# Patient Record
Sex: FEMALE | URBAN - METROPOLITAN AREA
[De-identification: names, ages, dates, MRNs, and addresses within clinical notes are randomized per-mention and may not be internally consistent; named-entity substitution may affect disease eponyms.]

---

## 2024-02-13 ENCOUNTER — OV NP (OUTPATIENT)
Dept: URBAN - METROPOLITAN AREA CLINIC 35 | Facility: CLINIC | Age: 44
End: 2024-02-13
Payer: COMMERCIAL

## 2024-02-13 ENCOUNTER — LAB (OUTPATIENT)
Dept: URBAN - METROPOLITAN AREA CLINIC 35 | Facility: CLINIC | Age: 44
End: 2024-02-13

## 2024-02-13 VITALS
DIASTOLIC BLOOD PRESSURE: 78 MMHG | WEIGHT: 160 LBS | SYSTOLIC BLOOD PRESSURE: 118 MMHG | OXYGEN SATURATION: 97 % | BODY MASS INDEX: 26.66 KG/M2 | HEIGHT: 65 IN | HEART RATE: 81 BPM

## 2024-02-13 DIAGNOSIS — Z12.11 ENCOUNTER FOR SCREENING FOR MALIGNANT NEOPLASM OF COLON: ICD-10-CM

## 2024-02-13 DIAGNOSIS — D50.9 IRON DEFICIENCY ANEMIA, UNSPECIFIED IRON DEFICIENCY ANEMIA TYPE: ICD-10-CM

## 2024-02-13 PROBLEM — 87522002: Status: ACTIVE | Noted: 2024-02-13

## 2024-02-13 PROBLEM — 305058001: Status: ACTIVE | Noted: 2024-02-13

## 2024-02-13 PROCEDURE — 99204 OFFICE O/P NEW MOD 45 MIN: CPT | Performed by: INTERNAL MEDICINE

## 2024-02-13 RX ORDER — VIT C/HESPERIDIN/BIOFLAVONOIDS 500-100 MG
1 TABLET TABLET ORAL ONCE A DAY
Status: ACTIVE | COMMUNITY

## 2024-02-13 RX ORDER — FERRIC CARBOXYMALTOSE INJECTION 50 MG/ML
AS DIRECTED INJECTION, SOLUTION INTRAVENOUS
OUTPATIENT
Start: 2024-02-13 | End: 2024-02-27

## 2024-02-13 RX ORDER — MULTIVIT-MIN/IRON/FOLIC ACID/K 18-600-40
1 CAPSULE CAPSULE ORAL ONCE A DAY
Status: ACTIVE | COMMUNITY

## 2024-02-13 RX ORDER — SODIUM, POTASSIUM,MAG SULFATES 17.5-3.13G
AS DIRECTED SOLUTION, RECONSTITUTED, ORAL ORAL AS DIRECTED
Qty: 1 | Refills: 0 | OUTPATIENT
Start: 2024-02-13 | End: 2024-02-14

## 2024-02-13 RX ORDER — FERROUS SULFATE 325(65) MG
1 TABLET TABLET ORAL
Status: ACTIVE | COMMUNITY

## 2024-02-13 NOTE — HPI-COLORECTAL CANCER SCREENING
43 year old patient presents for colorectal cancerscreening consult. Patient is here due to age . Patient admits this will be first colonoscopy. Patient denies family hx of colon cancer. Patient denies family hx of colon polyps. Patient admits normal bowel habits at this time. Patient denies any current symptomsof rectal bleeding, melena or mucus. Patient denies any concerns at this time.

## 2024-02-13 NOTE — HPI-ANEMIA
Patient was first diagnosed 01/26/24 post recent labs  by  ,and the most recent labs were taken (01/26/2024). Patient currently states that they are taking iron supplements and that they have not had iron infusion. Patient denies donating blood . Patient denies NSAID use, a history of GI bleeding, any blood or melena instools, epigastric/abdominal pains, fatigue, cold extremities, lightheadedness, dizziness.    Patient states that they have not had an EGD before.  Pt. has not had colonoscopy done yet. She denies any nausea or vomiting.  Of note patient had hx of very heavy menstrual cycles . She also states she had Uterine u/s which revealed uterine polyps .   Outside Labs; (01/22/2024)  CBC: Hgb is LOW at 9.8, Hct is LOW at 32.7, MCV is LOW at 78, MCH is LOW at 23.4, MCHC is LOW at 30, RDW is HIGH at 15.9 , All other values within normal range.   CMP : All values within normal range .   (01/26/2024)  Iron &TIBC : Iron Sat is LOW at 8, All other values normal   Ferritin is LOW at 6.

## 2024-03-04 ENCOUNTER — IRN (OUTPATIENT)
Dept: URBAN - METROPOLITAN AREA CLINIC 97 | Facility: CLINIC | Age: 44
End: 2024-03-04
Payer: COMMERCIAL

## 2024-03-04 VITALS
RESPIRATION RATE: 18 BRPM | HEIGHT: 65 IN | HEART RATE: 76 BPM | BODY MASS INDEX: 26.99 KG/M2 | DIASTOLIC BLOOD PRESSURE: 77 MMHG | TEMPERATURE: 98.1 F | WEIGHT: 162 LBS | SYSTOLIC BLOOD PRESSURE: 121 MMHG

## 2024-03-04 DIAGNOSIS — D50.8 OTHER IRON DEFICIENCY ANEMIA: ICD-10-CM

## 2024-03-04 PROCEDURE — 96365 THER/PROPH/DIAG IV INF INIT: CPT | Performed by: INTERNAL MEDICINE

## 2024-03-04 RX ORDER — VIT C/HESPERIDIN/BIOFLAVONOIDS 500-100 MG
1 TABLET TABLET ORAL ONCE A DAY
Status: ACTIVE | COMMUNITY

## 2024-03-04 RX ORDER — FERROUS SULFATE 325(65) MG
1 TABLET TABLET ORAL
Status: ACTIVE | COMMUNITY

## 2024-03-04 RX ORDER — MULTIVIT-MIN/IRON/FOLIC ACID/K 18-600-40
1 CAPSULE CAPSULE ORAL ONCE A DAY
Status: ACTIVE | COMMUNITY

## 2024-03-07 ENCOUNTER — LAB (OUTPATIENT)
Dept: URBAN - METROPOLITAN AREA CLINIC 4 | Facility: CLINIC | Age: 44
End: 2024-03-07
Payer: COMMERCIAL

## 2024-03-07 ENCOUNTER — COL/EGD (OUTPATIENT)
Dept: URBAN - METROPOLITAN AREA SURGERY CENTER 8 | Facility: SURGERY CENTER | Age: 44
End: 2024-03-07
Payer: COMMERCIAL

## 2024-03-07 DIAGNOSIS — B96.81 HELICOBACTER PYLORI [H. PYLORI] AS THE CAUSE OF DISEASES CLASSIFIED ELSEWHERE: ICD-10-CM

## 2024-03-07 DIAGNOSIS — K31.89 OTHER DISEASES OF STOMACH AND DUODENUM: ICD-10-CM

## 2024-03-07 DIAGNOSIS — K29.60 OTHER GASTRITIS WITHOUT BLEEDING: ICD-10-CM

## 2024-03-07 DIAGNOSIS — Z12.11 ENCOUNTER FOR SCREENING FOR MALIGNANT NEOPLASM OF COLON: ICD-10-CM

## 2024-03-07 DIAGNOSIS — D50.9 IRON DEFICIENCY ANEMIA, UNSPECIFIED IRON DEFICIENCY ANEMIA TYPE: ICD-10-CM

## 2024-03-07 PROCEDURE — 88342 IMHCHEM/IMCYTCHM 1ST ANTB: CPT | Performed by: PATHOLOGY

## 2024-03-07 PROCEDURE — 88305 TISSUE EXAM BY PATHOLOGIST: CPT | Performed by: PATHOLOGY

## 2024-03-07 PROCEDURE — 88341 IMHCHEM/IMCYTCHM EA ADD ANTB: CPT | Performed by: PATHOLOGY

## 2024-03-07 PROCEDURE — 43239 EGD BIOPSY SINGLE/MULTIPLE: CPT | Performed by: INTERNAL MEDICINE

## 2024-03-07 PROCEDURE — 45378 DIAGNOSTIC COLONOSCOPY: CPT | Performed by: INTERNAL MEDICINE

## 2024-03-18 ENCOUNTER — IRN (OUTPATIENT)
Dept: URBAN - METROPOLITAN AREA CLINIC 97 | Facility: CLINIC | Age: 44
End: 2024-03-18
Payer: COMMERCIAL

## 2024-03-18 VITALS
SYSTOLIC BLOOD PRESSURE: 128 MMHG | DIASTOLIC BLOOD PRESSURE: 90 MMHG | HEIGHT: 65 IN | HEART RATE: 71 BPM | WEIGHT: 160 LBS | TEMPERATURE: 97.7 F | BODY MASS INDEX: 26.66 KG/M2 | RESPIRATION RATE: 18 BRPM

## 2024-03-18 DIAGNOSIS — D50.8 OTHER IRON DEFICIENCY ANEMIA: ICD-10-CM

## 2024-03-18 PROCEDURE — 96365 THER/PROPH/DIAG IV INF INIT: CPT | Performed by: INTERNAL MEDICINE

## 2024-03-18 RX ORDER — FERROUS SULFATE 325(65) MG
1 TABLET TABLET ORAL
Status: ACTIVE | COMMUNITY

## 2024-03-18 RX ORDER — VIT C/HESPERIDIN/BIOFLAVONOIDS 500-100 MG
1 TABLET TABLET ORAL ONCE A DAY
Status: ACTIVE | COMMUNITY

## 2024-03-18 RX ORDER — MULTIVIT-MIN/IRON/FOLIC ACID/K 18-600-40
1 CAPSULE CAPSULE ORAL ONCE A DAY
Status: ACTIVE | COMMUNITY

## 2024-03-20 ENCOUNTER — OV EP (OUTPATIENT)
Dept: URBAN - METROPOLITAN AREA CLINIC 33 | Facility: CLINIC | Age: 44
End: 2024-03-20
Payer: COMMERCIAL

## 2024-03-20 VITALS
HEART RATE: 83 BPM | DIASTOLIC BLOOD PRESSURE: 88 MMHG | WEIGHT: 159 LBS | SYSTOLIC BLOOD PRESSURE: 122 MMHG | BODY MASS INDEX: 26.49 KG/M2 | HEIGHT: 65 IN | OXYGEN SATURATION: 98 %

## 2024-03-20 DIAGNOSIS — Z12.11 ENCOUNTER FOR SCREENING FOR MALIGNANT NEOPLASM OF COLON: ICD-10-CM

## 2024-03-20 DIAGNOSIS — D50.9 IRON DEFICIENCY ANEMIA, UNSPECIFIED IRON DEFICIENCY ANEMIA TYPE: ICD-10-CM

## 2024-03-20 DIAGNOSIS — A04.8 HELICOBACTER PYLORI (H. PYLORI) INFECTION: ICD-10-CM

## 2024-03-20 PROCEDURE — 99214 OFFICE O/P EST MOD 30 MIN: CPT | Performed by: INTERNAL MEDICINE

## 2024-03-20 RX ORDER — TETRACYCLINE HYDROCHLORIDE 500 MG/1
1 CAPSULE ON AN EMPTY STOMACH CAPSULE ORAL
Qty: 56 CAPSULE | Refills: 0 | OUTPATIENT
Start: 2024-03-20 | End: 2024-04-03

## 2024-03-20 RX ORDER — VIT C/HESPERIDIN/BIOFLAVONOIDS 500-100 MG
1 TABLET TABLET ORAL ONCE A DAY
Status: ON HOLD | COMMUNITY

## 2024-03-20 RX ORDER — METRONIDAZOLE 500 MG/1
1 TABLET TABLET ORAL
Qty: 56 TABLET | Refills: 0 | OUTPATIENT
Start: 2024-03-20 | End: 2024-04-03

## 2024-03-20 RX ORDER — OMEPRAZOLE 40 MG/1
ONE BEFORE MEALS CAPSULE, DELAYED RELEASE ORAL TWICE A DAY
Qty: 28 | Refills: 0 | OUTPATIENT
Start: 2024-03-20

## 2024-03-20 RX ORDER — FERROUS SULFATE 325(65) MG
1 TABLET TABLET ORAL
Status: ON HOLD | COMMUNITY

## 2024-03-20 RX ORDER — MULTIVIT-MIN/IRON/FOLIC ACID/K 18-600-40
1 CAPSULE CAPSULE ORAL ONCE A DAY
Status: ON HOLD | COMMUNITY

## 2024-03-20 NOTE — HPI-COLONOSCOPY FOLLOWUP
Patient presents today for follow up for Colonoscopy.  Patient had colonoscopy completed on 03/07/2024, with results noted below. Patient denies any complications after procedure. Since the procedure denies dysphagia, Globus, changes in appetite. Patient currently admits normal bowel habits. Patient denies rectal bleeding, melena, or mucus.

## 2024-03-20 NOTE — HPI-ANEMIA
Patient presents for a follow up for Anemia and EGD. Since the procedure on 03/07/2024, positive for H. Pylori. Patient denies dysphagia, Globus, changes in appetite, and changes in bowel habits. Patient denies taking Iron suppliments at this moment, because she recently completed Iron Infusion 03/18/2024. Patient says she is still experiencing fatigue, cold extremities, dizziness, lightheadedness.   Of last visit(02/13/2024)Patient was first diagnosed 01/26/24 post recent labs  by  ,and the most recent labs were taken (01/26/2024). Patient currently states that they are taking iron supplements and that they have not had iron infusion. Patient denies donating blood . Patient denies NSAID use, a history of GI bleeding, any blood or melena in stools, epigastric/abdominal pains, fatigue, cold extremities, lightheadedness, dizziness.    Patient states that they have not had an EGD before.  Pt. has not had colonoscopy done yet. She denies any nausea or vomiting.  Of note patient had hx of very heavy menstrual cycles . She also states she had Uterine u/s which revealed uterine polyps .   Outside Labs; (01/22/2024)  CBC: Hgb is LOW at 9.8, Hct is LOW at 32.7, MCV is LOW at 78, MCH is LOW at 23.4, MCHC is LOW at 30, RDW is HIGH at 15.9 , All other values within normal range.   CMP : All values within normal range .   (01/26/2024)  Iron &TIBC : Iron Sat is LOW at 8, All other values normal   Ferritin is LOW at 6.

## 2024-05-14 ENCOUNTER — TELEPHONE ENCOUNTER (OUTPATIENT)
Dept: URBAN - METROPOLITAN AREA CLINIC 35 | Facility: CLINIC | Age: 44
End: 2024-05-14

## 2024-05-17 ENCOUNTER — OFFICE VISIT (OUTPATIENT)
Dept: URBAN - METROPOLITAN AREA CLINIC 35 | Facility: CLINIC | Age: 44
End: 2024-05-17